# Patient Record
Sex: MALE | Race: WHITE | Employment: FULL TIME | URBAN - METROPOLITAN AREA
[De-identification: names, ages, dates, MRNs, and addresses within clinical notes are randomized per-mention and may not be internally consistent; named-entity substitution may affect disease eponyms.]

---

## 2019-07-31 ENCOUNTER — OFFICE VISIT (OUTPATIENT)
Dept: FAMILY MEDICINE | Facility: CLINIC | Age: 54
End: 2019-07-31

## 2019-07-31 VITALS
BODY MASS INDEX: 25.03 KG/M2 | HEART RATE: 86 BPM | HEIGHT: 70 IN | TEMPERATURE: 97.6 F | RESPIRATION RATE: 16 BRPM | OXYGEN SATURATION: 97 % | SYSTOLIC BLOOD PRESSURE: 112 MMHG | DIASTOLIC BLOOD PRESSURE: 74 MMHG | WEIGHT: 174.8 LBS

## 2019-07-31 DIAGNOSIS — Z71.84 TRAVEL ADVICE ENCOUNTER: Primary | ICD-10-CM

## 2019-07-31 DIAGNOSIS — E78.00 PURE HYPERCHOLESTEROLEMIA: ICD-10-CM

## 2019-07-31 DIAGNOSIS — R35.1 BENIGN PROSTATIC HYPERPLASIA WITH NOCTURIA: ICD-10-CM

## 2019-07-31 DIAGNOSIS — N40.1 BENIGN PROSTATIC HYPERPLASIA WITH NOCTURIA: ICD-10-CM

## 2019-07-31 DIAGNOSIS — Z71.2 ENCOUNTER TO DISCUSS TEST RESULTS: ICD-10-CM

## 2019-07-31 RX ORDER — MEFLOQUINE HYDROCHLORIDE 250 MG/1
250 TABLET ORAL
Qty: 57 TABLET | Refills: 0 | Status: SHIPPED | OUTPATIENT
Start: 2019-07-31 | End: 2020-09-03

## 2019-07-31 RX ORDER — ATOVAQUONE AND PROGUANIL HYDROCHLORIDE 250; 100 MG/1; MG/1
1 TABLET, FILM COATED ORAL DAILY
Qty: 400 TABLET | Refills: 0 | Status: SHIPPED | OUTPATIENT
Start: 2019-07-31 | End: 2020-07-30

## 2019-07-31 ASSESSMENT — MIFFLIN-ST. JEOR: SCORE: 1644.14

## 2019-07-31 NOTE — PROGRESS NOTES
Assessment and Plan   1. Travel advice encounter  Reviewed CDC guidelines with the patient, he is UTD on routine vaccinations. Patient should be UTD on HAV and HBV - I offered to 1-allow him to search for records of his vaccinations in the [facility] 2-draw titers today 3-re administer vaccines, he chose option 1. Gave Rx for both the daily and the weekly antimalarial and he will get the more affordable one from the pharmacy - gave enough supply of either to last for his full stay and return. Gave Rx for PO Typhoid and educated that this will last 5 years. Recommended he call [facility] or another travel clinic to try to obtain Yellow Fever vaccine although did inform him that there is limited supply.  - atovaquone-proguanil (MALARONE) 250-100 MG tablet; Take 1 tablet by mouth daily Start 2 days before travel and continue 7 days after return.  Dispense: 400 tablet; Refill: 0  - typhoid (VIVOTIF) CR capsule; Take 1 capsule by mouth every other day for 4 doses One capsule on alternate days (day 1, 3, 5, and 7) for a total of 4 doses; all doses should be complete at least 1 week prior to potential exposure.  Dispense: 4 capsule; Refill: 0  - mefloquine (LARIAM) 250 MG tablet; Take 1 tablet (250 mg) by mouth every 7 days  Dispense: 57 tablet; Refill: 0    2. Encounter to discuss test results  Reviewed normal CBC w/Diff, normal CMP, PTT/INR, PSA blood test results. Converted lipid panel values and LDL of 204. CXR normal, Echo normal, US of abdomen and prostate showed enlarged prostate. EKG normal, stress test normal.    3. Pure hypercholesterolemia  Discussed his 5% ASCVD risk and given his LDL level it would be recommended to start a moderate-intensity statin. Patient declined after being informed of the risks and benefits, namely muscle aches and reducing heart attack and stroke.    4. Benign prostatic hyperplasia with nocturia  Patient with enlarged prostate on US, normal PSA, only symptoms are nocturia.  Refused medication to help alleviate symptoms.    Follow up upon return in 1 year.    Options for treatment and follow-up care were reviewed with the patient and/or guardian. Dwayne Camarillo and/or guardian engaged in the decision making process and verbalized understanding of the options discussed and agreed with the final plan.    Hardik Loja MD  [facility], PGY-3    Precepted patient with Dr. Perry Araiza       HPI:   Dwayne Camarillo is a 54 year old male who presents to clinic today for   Chief Complaint   Patient presents with     Imm/Inj     Pt would like to discuss travel vaccines     Medication Reconciliation     Completed     Results     Tests done in [facility]. Pt has results/tests with him     Patient will be traveling to [place] on 8/12/2019 and will be there for approximately 1 year. He has already received some vaccines, but will need additional ones today.    He had the Rabies course in 2016, Typhoid in 2016 (does not remember if PO or IM), MMR booster in July of this year along with DTAP and HAV and HBV - these were given in the [facility] where he was living last year, they were given about 3 weeks ago.    Plans to be located mostly in cities, but will also be doing some traveling to other  countries.    Has never used antimalarials, wanted yellow fever vaccine.    He also brings in some lab and test results done in the [facility] that he would like me to review with him.         Review of Systems:     Constitutional, HEENT, cardiovascular, pulmonary, GI, musculoskeletal, neuro, skin, and psych systems are negative, except as otherwise noted.          PMHX:   Active Problems List  Patient Active Problem List   Diagnosis     Pure hypercholesterolemia     Benign prostatic hyperplasia with nocturia     Active problem list reviewed and updated.    Current Medications  Current Outpatient Medications   Medication Sig  "Dispense Refill     atovaquone-proguanil (MALARONE) 250-100 MG tablet Take 1 tablet by mouth daily Start 2 days before travel and continue 7 days after return. 400 tablet 0     mefloquine (LARIAM) 250 MG tablet Take 1 tablet (250 mg) by mouth every 7 days 57 tablet 0     typhoid (VIVOTIF) CR capsule Take 1 capsule by mouth every other day for 4 doses One capsule on alternate days (day 1, 3, 5, and 7) for a total of 4 doses; all doses should be complete at least 1 week prior to potential exposure. 4 capsule 0     Medication list reviewed and updated.    Social History  Social History     Tobacco Use     Smoking status: Never Smoker     Smokeless tobacco: Never Used   Substance Use Topics     Alcohol use: Yes     Comment: Occasionally     Drug use: Never     History   Drug Use Unknown     Family History  History reviewed. No pertinent family history.    Allergies  No Known Allergies       Physical Exam:     Vitals:    07/31/19 0841   BP: 112/74   Pulse: 86   Resp: 16   Temp: 97.6  F (36.4  C)   TempSrc: Oral   SpO2: 97%   Weight: 79.3 kg (174 lb 12.8 oz)   Height: 1.778 m (5' 10\")     Body mass index is 25.08 kg/m .    GENERAL APPEARANCE: alert, appears stated age, no acute distress  HEENT: Eyes grossly normal to inspection, nares normal, and mouth and throat without erythema, ulcers, or lesions  RESP: lungs clear to auscultation - no rales, rhonchi, or wheezes  CV: regular rate and rhythm, no murmur, click, rub, or gallop  ABDOMEN: soft, nontender   MSK: extremities normal, no gross deformities noted, no lower extremity edema  SKIN: no suspicious lesions or rashes   NEURO: Normal strength and tone, sensory exam grossly normal, mentation appears intact and speech normal  PSYCH: mood and affect normal/bright  "

## 2019-08-07 NOTE — PROGRESS NOTES
Preceptor Attestation:  Patient's case reviewed and discussed with Hardik Loja MD resident and I evaluated the patient. I agree with written assessment and plan of care.  Supervising Physician:  Perry Araiza MD MD  [facility]

## 2021-05-28 ENCOUNTER — RECORDS - HEALTHEAST (OUTPATIENT)
Dept: ADMINISTRATIVE | Facility: CLINIC | Age: 56
End: 2021-05-28